# Patient Record
Sex: FEMALE | Race: OTHER | HISPANIC OR LATINO | Employment: UNEMPLOYED | URBAN - METROPOLITAN AREA
[De-identification: names, ages, dates, MRNs, and addresses within clinical notes are randomized per-mention and may not be internally consistent; named-entity substitution may affect disease eponyms.]

---

## 2020-02-22 ENCOUNTER — HOSPITAL ENCOUNTER (EMERGENCY)
Facility: HOSPITAL | Age: 50
Discharge: HOME | End: 2020-02-22
Attending: EMERGENCY MEDICINE | Admitting: EMERGENCY MEDICINE

## 2020-02-22 VITALS
HEART RATE: 65 BPM | SYSTOLIC BLOOD PRESSURE: 119 MMHG | TEMPERATURE: 97.9 F | RESPIRATION RATE: 16 BRPM | WEIGHT: 163.14 LBS | DIASTOLIC BLOOD PRESSURE: 79 MMHG | OXYGEN SATURATION: 100 %

## 2020-02-22 DIAGNOSIS — R51.9 HEADACHE: ICD-10-CM

## 2020-02-22 DIAGNOSIS — R04.0 EPISTAXIS: Primary | ICD-10-CM

## 2020-02-22 LAB
EXT PREG TEST URINE: NEGATIVE
EXT. CONTROL ED NAV: NORMAL

## 2020-02-22 PROCEDURE — 96372 THER/PROPH/DIAG INJ SC/IM: CPT

## 2020-02-22 PROCEDURE — 81025 URINE PREGNANCY TEST: CPT | Performed by: EMERGENCY MEDICINE

## 2020-02-22 PROCEDURE — 99283 EMERGENCY DEPT VISIT LOW MDM: CPT

## 2020-02-22 PROCEDURE — 99284 EMERGENCY DEPT VISIT MOD MDM: CPT | Performed by: EMERGENCY MEDICINE

## 2020-02-22 RX ORDER — KETOROLAC TROMETHAMINE 30 MG/ML
15 INJECTION, SOLUTION INTRAMUSCULAR; INTRAVENOUS ONCE
Status: COMPLETED | OUTPATIENT
Start: 2020-02-22 | End: 2020-02-22

## 2020-02-22 RX ORDER — ACETAMINOPHEN 325 MG/1
975 TABLET ORAL ONCE
Status: COMPLETED | OUTPATIENT
Start: 2020-02-22 | End: 2020-02-22

## 2020-02-22 RX ADMIN — KETOROLAC TROMETHAMINE 15 MG: 30 INJECTION, SOLUTION INTRAMUSCULAR at 16:36

## 2020-02-22 RX ADMIN — ACETAMINOPHEN 975 MG: 325 TABLET ORAL at 16:33

## 2020-02-22 NOTE — ED ATTENDING ATTESTATION
2/22/2020  IMaurilio DO, saw and evaluated the patient  I have discussed the patient with the resident/non-physician practitioner and agree with the resident's/non-physician practitioner's findings, Plan of Care, and MDM as documented in the resident's/non-physician practitioner's note, except where noted  All available labs and Radiology studies were reviewed  I was present for key portions of any procedure(s) performed by the resident/non-physician practitioner and I was immediately available to provide assistance  At this point I agree with the current assessment done in the Emergency Department  I have conducted an independent evaluation of this patient a history and physical is as follows:    Patient is a 63-year-old predominantly Emirati-speaking female accompanied by her sister-in-law  Indicates she would prefer to have the sister-in-law service a  rather using a  service  Patient presents predominantly because for the last 4 days she has been having some episodic bleeding of the left nostril  No bleeding down the posterior pharynx  Comes and goes at random, usually last a few minutes, easily able to be controlled by direct pressure  Has forced air heating in the house  No history of trauma  No fever or chills  Also for the last 4 days she has had the gradual onset of a dull left-sided headache which feels identical to prior recurrent headaches that she frequently has  Normally she gets some good complete relief with taking ibuprofen, however that has not completely aborted the headache today although the headache is not the primary reason she presents today  There is no one else sick with similar headaches at home, no recent head or neck infections, no recent head or neck surgeries  Headache was not maximal in onset      General:  Patient is well-appearing  Head:  Atraumatic  Eyes:  Conjunctiva pink, Extraocular muscle intact, PERRL  ENT:  Mucous membranes are moist, no active bleeding noted, no septal hematoma, no signs of trauma  Neck:  Supple  Cardiac:  S1-S2, without murmurs  Lungs:  Clear to auscultation bilaterally  Abdomen:  Soft, nontender, normal bowel sounds, no CVA tenderness, no tympany, no rigidity, no guarding  Extremities:  Normal range of motion  Neurologic:  Awake, fluent speech, normal comprehension  AAOx3  Cranial nerves 2-12 are intact, strength is 5/5 in the bilateral upper & lower extremities, no slurred speech, no facial droop, no deficit on finger-to-nose testing, no pronator drift  Sensation to light touch is equal and symmetric throughout the whole body  Skin:  Pink warm and dry, no rash  Psychiatric:  Alert, pleasant, cooperative            ED Course     I do not believe that the patient has meningitis, subarachnoid hemorrhage, carbon monoxide exposure headache, or cavernous sinus thrombosis  I discussed supportive care, importance of follow-up and return precautions    Understanding was expressed by patient and sister-in-law      Critical Care Time  Procedures

## 2020-02-22 NOTE — DISCHARGE INSTRUCTIONS
Follow-up with primary care, information provided below  Take Tylenol and ibuprofen as needed for pain  As instructed use pressure to stop bleeding from her nose, blow your clots out and reassess  Use humidifier or Vaseline gel in urine Fredis for dehydration  Return to the emergency depart with any worsening concerning symptoms

## 2020-02-23 NOTE — ED PROVIDER NOTES
History  Chief Complaint   Patient presents with    Headache     Patient c/o headache and nose bleed for past 4 days  Hx of migraines, no hx of nose bleeds   Nose Bleed     60-year-old female presenting for evaluation of headache for the past 4 days as well as intermittent nose bleeds  Patient states these nosebleeds resolved spontaneously she states he has not been picking nose does not have any bleeding down the back of her throat all that has been anterior bleeding that stops on its own she denies any worrisome symptoms including neurologic symptoms no fevers chills neck pain or stiffness states this feels exactly like her typical migraine she has had before she has taken Excedrin without improvement of her symptoms does not take ibuprofen due to GI intolerance at times  She is ambulatory and she has no active bleeding from her nose at this time no associated nausea vomiting or photophobia  She does take 81 mg aspirin but has no medical reason to do so she does not follow with a primary care        History provided by:  Patient   used: No    Headache   Pain location:  Generalized  Quality:  Dull  Radiates to:  Does not radiate  Onset quality:  Gradual  Timing:  Constant  Progression:  Unchanged  Chronicity:  New  Similar to prior headaches: yes    Relieved by:  NSAIDs and acetaminophen  Worsened by:  Nothing  Ineffective treatments:  Acetaminophen  Associated symptoms: no abdominal pain, no diarrhea, no fever, no myalgias, no nausea, no sore throat and no vomiting    Nose Bleed   Location:  L nare  Severity:  Mild  Timing:  Sporadic  Progression:  Unchanged  Context: aspirin use    Relieved by:  Applying pressure  Worsened by:  Nothing  Ineffective treatments:  None tried  Associated symptoms: headaches    Associated symptoms: no fever and no sore throat        None       Past Medical History:   Diagnosis Date    Migraine        Past Surgical History:   Procedure Laterality Date     SECTION         History reviewed  No pertinent family history  I have reviewed and agree with the history as documented  Social History     Tobacco Use    Smoking status: Never Smoker    Smokeless tobacco: Never Used   Substance Use Topics    Alcohol use: Not Currently    Drug use: Not on file        Review of Systems   Constitutional: Negative for chills and fever  HENT: Positive for nosebleeds  Negative for sore throat  Eyes: Negative for visual disturbance  Respiratory: Negative for chest tightness, shortness of breath and wheezing  Cardiovascular: Negative for chest pain  Gastrointestinal: Negative for abdominal pain, constipation, diarrhea, nausea and vomiting  Genitourinary: Negative for dysuria  Musculoskeletal: Negative for arthralgias and myalgias  Skin: Negative for color change  Neurological: Positive for headaches  Negative for light-headedness  Hematological: Negative for adenopathy  Psychiatric/Behavioral: Negative for agitation and behavioral problems  All other systems reviewed and are negative  Physical Exam  ED Triage Vitals [20 1530]   Temperature Pulse Respirations Blood Pressure SpO2   97 9 °F (36 6 °C) 76 16 134/83 99 %      Temp Source Heart Rate Source Patient Position - Orthostatic VS BP Location FiO2 (%)   Temporal Monitor Sitting Right arm --      Pain Score       8             Orthostatic Vital Signs  Vitals:    20 1530 20 1632   BP: 134/83 119/79   Pulse: 76 65   Patient Position - Orthostatic VS: Sitting Lying       Physical Exam   Constitutional: She is oriented to person, place, and time  She appears well-developed and well-nourished  No distress  HENT:   Head: Normocephalic and atraumatic  Eyes: Conjunctivae and EOM are normal  No scleral icterus  Neck: Normal range of motion  Neck supple  Cardiovascular: Normal rate, regular rhythm and normal heart sounds  No murmur heard    Pulmonary/Chest: Effort normal and breath sounds normal  No respiratory distress  Abdominal: Soft  Bowel sounds are normal  There is no tenderness  Musculoskeletal: Normal range of motion  Neurological: She is alert and oriented to person, place, and time  Skin: Skin is warm and dry  Psychiatric: She has a normal mood and affect  Her behavior is normal    Nursing note and vitals reviewed  ED Medications  Medications   ketorolac (TORADOL) injection 15 mg (15 mg Intramuscular Given 2/22/20 1636)   acetaminophen (TYLENOL) tablet 975 mg (975 mg Oral Given 2/22/20 1633)       Diagnostic Studies  Results Reviewed     Procedure Component Value Units Date/Time    POCT pregnancy, urine [[id number]]  (Normal) Resulted:  02/22/20 1633    Lab Status:  Final result Updated:  02/22/20 1633     EXT PREG TEST UR (Ref: Negative) negative     Control valid                 No orders to display         Procedures  Procedures      ED Course                 MDM  Number of Diagnoses or Management Options  Epistaxis: new and requires workup  Headache: new and requires workup  Diagnosis management comments: 70-year-old female presenting with left-sided epistaxis and headache, will treat with Toradol and Tylenol and have her follow-up with ear nose and throat and primary care for further evaluation  Gave return precautions and tips on how to avoid epistaxis with humidified air and Vaseline as well as how to blow her nose for clots and pinch with pressure         Amount and/or Complexity of Data Reviewed  Review and summarize past medical records: yes          Disposition  Final diagnoses:   Epistaxis   Headache     Time reflects when diagnosis was documented in both MDM as applicable and the Disposition within this note     Time User Action Codes Description Comment    2/22/2020  4:46 PM Huyen Solomon Add [R04 0] Epistaxis     2/22/2020  4:46 PM Huyen Solomon Add [R51] Headache       ED Disposition     ED Disposition Condition Date/Time Comment Discharge Stable Sat Feb 22, 2020  4:46 PM Andre Hu discharge to home/self care  Follow-up Information     Follow up With Specialties Details Why Contact Info Additional [address] [place] [address] Family Medicine Schedule an appointment as soon as possible for a visit   [address], [address] [facility] [address]  [address] [facility], [address], [address] [place], [place], [address] [facility] Emergency Department Emergency Medicine  If symptoms worsen [facility] [address] [phone] [place] ED, [address] [facility] , [place], [place], Andre Bustillo [address] [facility]   [address] [facility] [address]  [address] [facility], [address] [place], [place], [address] [phone]          There are no discharge medications for this patient  No discharge procedures on file  ED Provider  Attending physically available and evaluated Andre Hu I managed the patient along with the ED Attending      Electronically Signed by         Tiffanie Samuel MD  02/22/20 2053